# Patient Record
(demographics unavailable — no encounter records)

---

## 2017-03-27 NOTE — RAD
CHEST TWO VIEWS:

 

History: Pneumonia. 

 

Comparison: 4-20-15

 

FINDINGS: 

The cardiac silhouette is unremarkable. Lungs are hyperinflated with scattered areas of linear scarr
ing.  Mediastinum is midline with aortic calcification and sternotomy wires. There is no confluent a
ir space consolidation, pneumothorax, or pleural fluid evident. 

 

IMPRESSION: 

1. COPD. 

2. Atherosclerosis.

 

POS: BRITTANY

## 2017-11-07 NOTE — XMS
Continuity of Care Document

 Created on:2017



Patient:KIRSTEN TAVERAS

Sex:Male

:1930

External Reference #:[id number]





Demographics







 Address  [place] 



   [place] [address]

 

 Home Phone  [phone]

 

 Preferred Language  Unknown

 

 Marital Status  Unknown

 

 Zoroastrianism Affiliation  Unknown

 

 Race  Unknown

 

 Additional Race(s)  Unavailable

 

 Ethnic Group  Unknown









Author







 Organization  [facility]









Support







 Name  Relationship  Address  Phone

 

 DENITA GONZALEZ  Caregiver  [id number]  [phone]



     [address]  



     [place] [address]  

 

 Johnnycherie Thiago  Caregiver  [address] [facility]  [phone]



     [address]  



     [place] [address]  

 

 BERKLEY TAVERAS  Next of Kin  [address]   [phone]



     [place] [address]  









Care Team Providers







 Name  Role  Phone

 

 Thiago Montes  Primary Care Physician  [phone]









Insurance Providers







 Payer Name  Policy Number  Subscriber Name  Relationship

 

 [employer] MEDICARE ADVANTAGE PPO  [id number]  KIRSTEN TAVERAS  SELF/SAME AS PATIENT







Advance Directives







 Directive  Response  Recorded Date/Time

 

 Advance Directive?  N  17 3:39pm

 

 Living Will?  Y  17 3:39pm

 

 Health Care Proxy?  N  17 3:39pm

 

 Healthcare Power of ?  N  17 3:39pm

 

 Is the patient an Organ Donor?  N  17 3:39pm







Chief Complaint and Reason for Visit







 Reason for Visit  SORE THROAT







Problems

Active Medical Problems







 Problem  Onset Date  Recorded Date  Status

 

 Chest pain  Unknown  16  Active

 

 CAD (coronary artery disease)  Unknown  16  Active

 

 Seizure disorder  Unknown  16  Active

 

 Hyponatremia  Unknown  16  Active

 

 Anxiety attack  Unknown  16  Active

 

 Pneumonia  Unknown  17  Active

 

 Bronchitis  Unknown  17  Active

 

 Thrush of mouth and esophagus  Unknown  17  Active

 

 Oral ulceration  Unknown  17  Active

 

 Painful mouth  Unknown  17  Active





Active Surgical Problems







 Problem  Onset Date  Recorded Date  Status

 

 S/P CABG (coronary artery bypass graft)  Unknown  16  Active







Medications

Current Home Medications







 Medication  Dose  Units  Route  Directions  Days/Qty  Instructions  Start Date

 

 ASPIRIN  81  MG  By Mouth  EVERY DAY @  30    



 (ASPIR-LOW) 81        0900      



 MG TAB              

 

 Fluconazole  100  MG  By Mouth  EVERY DAY @  5    



 (DIFLUCAN 100 MG        0900      



 TAB) 100 MG TAB              

 

 Isosorbide  15  MG  By Mouth  EVERY DAY @      



 Mononitrate        0900      



 (Imdur) 30 MG              



 TAB              

 

 LIDOCAINE HCL  5  ML  Mouth /  EVERY SIX HOURS  1    17



 (MOUTH-THROAT)      Throat  AS NEEDED as      



 (LIDOCAINE        needed for      



 VISCOUS 2% ORAL        MOUTH PAIN      



 SOLN) 2 % SOL              

 

 METOPROLOL  25  MG  By Mouth  TWICE A DAY  60    



 TARTRATE        (0900; )      



 (LOPRESSOR 25 MG              



 TAB) 25 MG TAB              

 

 Nystatin  5  ML  Mouth /  FOUR TIMES A  200    



 (Mouth-Throat)      Throat  DAY (900)      



 (NYSTATIN              



 SUSPENSION              



 (ORAL)) 100,000              



 UNITS/ML VAN              

 

 Phenytoin Sodium  400  MG  By Mouth  AT BEDTIME      



 (DILANTIN 100 MG        ()      



 CAP) 100 MG CAP              

 

 Simvastatin  80  MG  By Mouth  AT BEDTIME  30    



 (ZOCOR 80 MG) 80        ()      



 MG TAB              





Past Home Medications







 Medication  Directions  Ordered  Status

 

 Acetaminophen W/Codeine #3  EVERY EIGHT HOURS AS NEEDED as  09/13/15  
Discontinued



 (Tylenol/Codeine #3 Tab) 300  needed for PAIN    



 Mg/30 Mg Tab Tab, 1 Tab By Mouth      

 

 Azithromycin (Azithromycin 500 Mg  EVERY DAY @ 0900  17  Discontinued



 Tab) 500 Mg Tab Tab, 500 Mg By      



 Mouth      

 

 Prednisone (Prednisone 20 Mg Tab)  EVERY DAY @ 17  Discontinued



 20 Mg Tab Tab, 20 Mg By Mouth      

 

 Promethazine & Phenyl 6.25/5MG  EVERY SIX HOURS AS NEEDED as  17  
Discontinued



 (Promethazine-Phenylephrine  6.25  needed for COUGH/CONGESTION    



 Mg/5 Mg/5 Ml) 5 Ml Syp Syp, 5 Ml      



 ByMouth      







Social History







 Problem  Response  Recorded Date

 

 Recreational drugs?  N  17

 

 Alcohol?  N  17











 Query  Response  Start Date  Stop Date

 

 Smoking Status:  Never Smoker    







Hospital Discharge Instructions

No hospital discharge instructions.



Plan of Care







 Discharge Date  17

 

 Disposition  HOME/SELF CARE

 

 Condition at Discharge  STABLE

 

 Instructions/Education Provided  DI for Mouth Pain

 

 Forms Provided  Discharge Form

 

 Prescriptions  See Medications Section

 

 Referrals  Thiago Montes -

 

 Additional Instructions/Education  F/U PCP ON MONDAY



   AVOID EXTREME TEMP OF FOOD



   CONT NYSTATIN SWISH AND SWALLOW AS RX BY PCP







Functional Status

No functional status results.



Allergies, Adverse Reactions, Alerts

No known allergies.



Immunizations

No Known History of Immunizations.



Vital Signs







 Vital Reading  Collection Date/Time  Result

 

 Blood Pressure  17 4:03pm  118/65

 

 Temperature  17 4:03pm  98.2 F

 

 Temperature Source  17 3:35pm  Oral

 

 Respiratory Rate  17 2:30pm  18

 

 Pulse Rate  17 4:03pm  76

 

 Bedside Pulse Oximetry  17 4:03pm  98

 

 Height  17 3:35pm  5 ft 10 in

 

 Height  17 3:35pm  177.8 cm

 

 Weight  17 3:35pm  200 lb

 

 Weight  17 3:35pm  90.718 kg

 

 Body Mass Index  17 3:35pm  28.7 kg/m2







Results

Laboratory Results







 Test Name  Result  Units  Flags  Reference  Collection  Result  Comments



           Date/Time  Date/Time  

 

 White Blood Count  9.1  X 10^3    4.8-10.8  17  



           1:50pm  2:41pm  

 

 Red Blood Count  4.21  X 10^6  L  4.70-6.00  17  



           1:50pm  2:41pm  

 

 Hemoglobin  13.9  g/dL    13.5-17.5  17  



           1:50pm  2:41pm  

 

 Hematocrit  41.1  %  L  42.0-52.0  17  



           1:50pm  2:41pm  

 

 Mean Corpuscular  97.6  fl    80.0-100.0  17  



 Volume          1:50pm  2:41pm  

 

 Mean Corpuscular  33.0  pg  H  27.0-31.0  17  



 Hemoglobin          1:50pm  2:41pm  

 

 Mean Corpuscular  33.8  g/dl    32.0-36.0  17  



 Hgb Concent Diff          1:50pm  2:41pm  

 

 Red Cell  13.4  %    11.5-14.5  17  



 Distribution Width          1:50pm  2:41pm  

 

 Platelet Count  224  X 10^3    130-400  17  



           1:50pm  2:41pm  

 

 Mean Platelet  7.3  fl  L  7.4-10.4  17  



 Volume          1:50pm  2:41pm  

 

 Granulocytes (%)  68.7  %    50.0-75.0  17  



           1:50pm  2:41pm  

 

 Lymphocytes %  23.8  %    20.0-40.0  17  



           1:50pm  2:41pm  

 

 Mid Range Cells %  7.5  %    0.1-24.0  17  



 (auto)          1:50pm  2:41pm  

 

 Granulocytes #  6.3  X 10^3    1.8-6.4  17  



           1:50pm  2:41pm  

 

 Lymphocytes #  2.2  X 10^3    1.2-3.6  17  



           1:50pm  2:41pm  

 

 Mid Range Cells #  0.7  X 10^3    0.0-1.8  17  



 (auto)          1:50pm  2:41pm  

 

 Manual  NO        17  



 Differential          1:50pm  2:41pm  

 

 Sodium Level  137  mmol/L    135-144  17  



           1:50pm  2:42pm  

 

 Potassium Level  5.0  mmol/L    3.5-5.1  17  



           1:50pm  2:42pm  

 

 Chloride Level  99  mmol/L  L  101-111  17  



           1:50pm  2:42pm  

 

 Carbon Dioxide  28  mmol/L    22-32  17  



 Level          1:50pm  2:42pm  

 

 Anion Gap  15.0  mmol/L    10-20  17  



           1:50pm  2:42pm  

 

 Random Glucose  122  mg/dL  H    17  Random glucose > 
200 mg/dL in a patient with typical



           1:50pm  2:42pm  symptoms of diabetes (polydipsia, polyuria and 
unexplained



               weight loss) satisfies ADA criteria for diabetes mellitus if



               confirmed by repeat testing on another day.



               



               Confirmation is unnecessary when acute metabolic



               decompensation with hyperglycemia is manifested.



               



               Reference:  Report of the Expert Committee on the Diagnosis



               and Classification of Diabetes Mellitus. Diabetes Care,



               20:1183, 1997.

 

 Creatinine  1.1  mg/dL    0.61-1.24  17  



           1:50pm  2:42pm  

 

 EGFR Note  > 60.0        17  eGFR (Estimated Glomerular 
Filtration Rate)



           1:50pm  2:42pm  Reference Range:  >60 ml/min/1.73m



               



               eGFR calculation value obtained using the MDRD equation. The



               reportable reference ranges is recommended to be greater



               than 60 ml/min/1.73m. This is an estimation of the patient's



               GFR and clinical correlation is recommended.

 

 Blood Urea  13  mg/dL    8-26  17  



 Nitrogen          1:50pm  2:42pm  

 

 Calcium Level  8.9  mg/dL    8.9-10.3  17  



           1:50pm  2:42pm  

 

 Albumin  3.7  g/dL    3.5-5.0  17  



           1:50pm  2:42pm  

 

 Total Bilirubin  0.5  mg/dL    0.3-1.2  17  



           1:50pm  2:42pm  

 

 Alkaline  82  IU/L    32-91  17  



 Phosphatase          1:50pm  2:42pm  

 

 Total Protein  7.4  g/dL    6.5-8.1  17  



           1:50pm  2:42pm  

 

 Alanine  17  IU/L    7-55  17  



 Aminotransferase          1:50pm  2:42pm  



 (ALT/SGPT)              

 

 Aspartate Amino  21  IU/L    15-41  17  



 Transf (AST/SGOT)          1:50pm  2:42pm  

 

 Globulin  3.7  g/dL  H  2.3-3.5  17  



           1:50pm  2:42pm  

 

 Albumin/Globulin  1.0    L  1.2-2.2  17  



 Ratio          1:50pm  2:42pm  

 

 Group A  NEGATIVE      NEGATIVE  17  Negative screens will be 
confirmed by culture. Please see



 Streptococcus          1:34pm  1:53pm  separate microbiology report for these 
results.



 Screen              

 

 Influenza Type A  NEGATIVE      NEGATIVE  17  A negative test 
result should be interpreted as a



 Antigen          1:34pm  1:53pm  presumptive negative for the presense of 
influenza A



               antigen.



               



               Negative results can occur from inadequate sample collection



               or levels of antigen which fall below the limits of



               detection of the test.

 

 Influenza Type B  NEGATIVE      NEGATIVE  17  A negative test 
result should be interpreted as a



 Antigen          1:34pm  1:53pm  presumptive negative for the presense of 
influenza B



               antigen.



               



               Negative results can occur from inadequate sample collection



               or levels of antigen which fall below the limits of



               detection of the test.







Procedures

No Known History of Procedures.



Encounters







 Encounter  Location  Arrival/Admit Date  Discharge/Depart Date  Attending



         Provider

 

 Departed  [place]  17 3:25pm  17 4:03pm  [facility]



 [facility]      

 

 Departed  [place]  17 1:27pm  17 3:25pm  [facility]



 Emergency  [facility]      











 Encounter Diagnosis  Onset Date

 

 Thrush of mouth and esophagus  

 

 Oral ulceration  

 

 Painful mouth

## 2017-11-07 NOTE — CT
CTA CHEST WITH  3D VOLUME RENDERIN17

 

CLINICAL HISTORY: 

Mid sternal chest pain. 

 

FINDINGS:  

No large, central filling defect of the pulmonary arterial system due to indicate pulmonary embolus.
 Multifocal linear density as well as scattered areas of ground glass and interstitial opacification
 of each lung are present favoring atelectasis and/or scar. Bleb formation at the right lung base is
 seen. No evidence of pleural effusions or pneumothorax. There is scattered vascular disease. 

 

IMPRESSION:  

No evidence of pulmonary embolus within the pulmonary trunk, main pulmonary arteries or proximal seg
mental branches.

 

There is scattered pulmonary and parenchymal opacities as discussed above. 

 

POS: ARIS

## 2017-11-07 NOTE — XMS
Continuity of Care Document

 Created on:2016



Patient:KIRSTEN DEL ROSARIO

Sex:Male

:1930

External Reference #:[id number]





Demographics







 Address  [place] 



   [place] [address]

 

 Phone  [phone]

 

 Preferred Language  Unknown

 

 Marital Status  Unknown

 

 Yazidism Affiliation  Unknown

 

 Race  Unknown

 

 Additional Race(s)  Unavailable

 

 Ethnic Group  Unknown









Author







 Organization  [facility]









Support







 Name  Relationship  Address  Phone

 

 Subhash Chan  Caregiver  [address]   Unavailable



     [place] [address]  

 

 hTiago Montes  Caregiver  [address]  Unavailable



     [address]  



     [place] [address]  

 

 NYDIA TORRES  Caregiver  [address]  Unavailable



     HOSPITALIST  



     [place] [address]  

 

 DARCYBERKLEY  Next of Kin  [address]   Unavailable



     [place] [address]  









Care Team Providers







 Name  Role  Phone

 

 Thiago Montes  Primary Care Physician  Unavailable









Insurance Providers







 Payer Name  Policy Number  Subscriber Name  Relationship

 

 [employer] MEDICARE ADVANTAGE PPO  [id number]  KIRSTEN DEL ROSARIO  SELF/SAME AS PATIENT







Advance Directives







 Directive  Response  Recorded Date/Time

 

 Advance Directive?  N  16 8:16pm

 

 Living Will?  Y  16 8:16pm

 

 Health Care Proxy?  N  16 8:16pm

 

 Healthcare Power of ?  N  16 8:16pm

 

 Is the patient an Organ Donor?  N  16 2:33pm







Chief Complaint and Reason for Visit







 Reason for Visit  CHEST PAIN







Problems

Active Medical Problems







 Problem  Onset Date  Recorded Date  Status

 

 Chest pain  Unknown  16  Active

 

 CAD (coronary artery disease)  Unknown  16  Active

 

 Seizure disorder  Unknown  16  Active

 

 Hyponatremia  Unknown  16  Active

 

 Anxiety attack  Unknown  16  Active





Active Surgical Problems







 Problem  Onset Date  Recorded Date  Status

 

 S/P CABG (coronary artery bypass graft)  Unknown  16  Active







Medications

Current Home Medications







 Medication  Dose  Units  Route  Directions  Days/Qty  Instructions  Start Date

 

 ASPIRIN  81  MG  PO  EVERY DAY @ 0900  30    



 (ASPIR-LOW) 81 MG              



 TAB              

 

 Isosorbide  15  MG  PO  EVERY DAY @ 0900      



 Mononitrate              



 (Imdur) 30 MG TAB              

 

 METOPROLOL  25  MG  PO  TWICE A DAY  60    



 TARTRATE        (0900; 2100)      



 (LOPRESSOR 25 MG              



 TAB) 25 MG TAB              

 

 Phenytoin Sodium  400  MG  PO  AT BEDTIME (2100)      



 (DILANTIN 100 MG              



 CAP) 100 MG CAP              

 

 Simvastatin (ZOCOR  80  MG  PO  AT BEDTIME (2100)  30    



 80 MG) 80 MG TAB              





Past Home Medications







 Medication  Directions  Ordered  Status

 

 Acetaminophen W/Codeine #3  EVERY EIGHT HOURS AS NEEDED  09/13/15  Discontinued



 (Tylenol #3) 300 Mg/30 Mg Tab  PRN PAIN    



 Tab, 1 Tab Po      







Social History







 Problem  Response  Recorded Date

 

 Recreational drugs?  N  16

 

 Alcohol?  N  16











 Query  Response  Start Date  Stop Date

 

 Smoking Status:  Former Smoker    







Hospital Discharge Instructions

Diagnosis: CHEST PAIN

Goal: IMPROVEMENT

Intervention:

MEDICATIONS, MONITOR TELEMETRY, MONITOR LABS, MONITOR VITAL SIGNS

Anticipated Discharge Date 16

Anticipated Discharge Time 0957







Plan of Care







 Discharge Date  16

 

 Disposition  HOME/SELF CARE

 

 Instructions/Education Provided  DI for Chest Pain

 

 Forms Provided  Patient Portal Logon Page

 

 Prescriptions  See Medications Section

 

 Additional Instructions/Education  FOLLOW UP [facility] PRIMARY CARE PHYSICIAN 
WITHIN 1 WEEK AND CARDIOLOGIST



   WITHIN 2 WEEKS



   



   



   DIET-CARDIAC



   ACTIVITY-REGULAR

 

 Care Plan and Goals  See Discharge Instructions section







Functional Status







 Query  Response  Date Recorded

 

 Speech Pattern:  Normal  2016 7:55pm

 

 Seizure Act.?  N  2016 7:55pm

 

 Sensory/Motor Response:  Normal  2016 7:55pm

 

 RUE Strength:  Normal  2016 7:55pm

 

 LUE Strength:  Normal  2016 7:55pm

 

 RLE Strength:  Normal  2016 7:55pm

 

 LLE Strength:  Normal  2016 7:55pm

 

 Gait:  Slow  2016 7:55pm

 

 WNL?+  Y  2016 9:07am

 

 Person:  N  2016 7:55pm

 

 Place:  N  2016 7:55pm

 

 Time:  N  2016 7:55pm

 

 Situation:  N  2016 7:55pm

 

 LOC:  Alert  2016 7:55pm







Allergies, Adverse Reactions, Alerts

No known allergies.



Immunizations







 Name  Date Given  Type

 

 Flu vaccine this seaso  N  Historical

 

 Pnuemonia Vaccine last 5 years?  Y  Historical







Vital Signs







 Vital Reading  Collection Date/Time  Result

 

 Blood Pressure  16 9:56am  136/66

 

 Blood Pressure Source  16 4:10am  Auto Cuff

 

 Patient Temperature  16 9:56am  97.9

 

 Temperature Source  16 11:44pm  Oral

 

 Respiratory Rate  16 9:56am  18

 

 Pulse Rate  16 9:56am  53

 

 Pulse Location  16 4:10am  Monitor

 

 Bedside Pulse Oximetry  16 9:56am  97

 

 Height  16 8:16pm  177.8 cm

 

 Height  16 8:16pm  5 ft 10 in

 

 Weight  16 6:00am  90.718 kg

 

 Weight  16 6:00am  200 lb 0.00 oz

 

 Body Mass Index  16 8:16pm  28.7







Results

Laboratory Results







 Test Name  Result  Units  Flags  Reference  Collection  Result  Comments



           Date/Time  Date/Time  

 

 Glucose  104  mg/dL      16  



 (Fingerstick)          8:04am  8:30am  

 

 N/A  CALCULATE        16  CORONARY HEART DISEASE (CHD) RISK 
FACTORS:



   BELOW        4:25am  5:12am  _________________________________________



               +1, Age (y):   Men, >45



               Women, >55 or Premature Menopause



               Without Estrogen Therapy



               +1, Family History of Premature CHD



               +1, Current Cigarette Smoking



               +1, Hypertension



               +1, Low HDL-C: <40 mg/dL



               -1, High HDL-C: 60 mg/dL or More



               _____  Total RFs



               



               CHD Risk Equivalents (REq): Diabetes



               Other Forms of Atherosclerotic Disease



               



               Lipid testing of hospitalized patients may be inaccurate due



               to fluctuations from the patients normal metabolic state.



               



               Accurate triglyceride and LDL testing requires a fasting



               specimen.  If non-fasting cholesterol > am=571 mg/dL or



               HDL is < 40 mg/dL, fasting lipid panel is recommended.



               Repeat testing recommended prior to treatment.



               ____________________________________________________________



               Desirable Levels

 

 Cholesterol  149  mg/dL    0-200  16  



 Level          4:25am  5:39am  

 

 Triglycerides  152  mg/dL  H    16  Normal triglycerides
:   <150 mg/dL



 Level          4:25am  5:39am  Borderline-high triglycerides:  150-199 mg/dL



               High triglycerides:  200-499 mg/dL



               Very high triglycerides: > ka=803 mg/dL

 

 HDL  38  mg/dL  L    16  



 Cholesterol          4:25am  5:39am  

 

 N/A  3.9      0.0-5.0  16  



           4:25am  5:39am  

 

 LDL  80  mg/dL    0-130  16  *LDL Cholesterol  <130 mg/dL, No 
CHD or CHD Risk Equivalent



 Cholesterol,          4:25am  5:39am  <100 mg/dL, With CHD or CHD Risk 
Equivalent



 Calculated              



               LDL Cholesterol Therapeutic Goal:



               100 mg/dL or Less if CHD or CHD Risk Equivalent Present



               <130 mg/dL if No CHD or REq; 2 or more Risk Factors



               <160 mg/dL if No CHD or REq; 0-1 Risk Factors



               



               Reference: ATP III, GINA, 285:38, 4505-97, 2001.

 

 Creatine  74  IU/L      16  



 Kinase          4:25am  5:39am  

 

 Hemoglobin  6.2  %  H  4.0-6.0  16  Elevated levels of HbA1c 
suggest the need for more



 A1c Percent          4:25am  5:35am  aggresssive treatment of glycemia. The 
American Diabetes



               Association recommends that a primary goal of therapy



               should be a HbA1c of <7% and that physicians should



               reevaluate the treatment regimen in patients with HbA1c



               values consistently >8%.

 

 N/A  132  mg/dL      16  A1C Result%    Estimated Avg.Glucose (
EAG)



           4:25am  5:35am  __________________________________________



               6.0%     126 mg/dL



               6.5%     140 mg/dL



               7.0%     154 mg/dL



               7.5%     169 mg/dL



               8.0%     183 mg/dL



               8.5%     197 mg/dL



               9.0%     212 mg/dL



               9.5%     226 mg/dL



               10.0%     240 mg/dL



               



               Reference: Tl, et al, Diabetes Care 31: 1437, 2008.

 

 Creatine  3.2  ng/ML    0.6-6.3  16  



 Kinase MB          4:25am  5:39am  

 

 Troponin I  < 0.01  ng/mL    0.00-0.03  16  



           4:25am  5:39am  Troponin I-Interpretation Reference :



               



               <0.03  ng/mL       NEGATIVE



               



               0.04 - 0.49 ng/mL  EQUIVOCAL



               



               =OR > 0.5 ng/mL   CONSISTENT WITH ACUTE MYOCARDIAL INJURY



               



               98% of confirmed AMI patients will have at least one value



               in a set or serial specimens >0.50 ng/ml.



               



               99% of normals are between 0.0 - 0.10 ng/ml.



               



               Serial samples on a patient that are all <0.10 ng/ml



               effectively rules out AMI.



               



               Persistently increased troponin I values that are above the



               upper limit of normal but below the threshold for AMI



               indicate mycardial injury but not necessarily an ischemic



               mechanism of injury.



               



               *** Troponin Important Points ***



               



               1. Troponin is specific for myocardial injury but not for



               AMI.  Elevated troponin levels above the upper limit of



               normal but below the AMI cutoff may be present in cardiac



               injury other then AMI and represent some degree of risk.



               



               2.  Elevated troponin levels inconsistent with patient



               history or clinical condition should be considered a sign to



               investigate for other cardiac conditions.



               



               3.  Serial sampling is critical for accurate diagnosis.

 

 Phenytoin  12  ug/mL    10-16  



 (Dilantin)          4:25am  5:39am  



 Level              

 

 White Blood  7.1  K/uL    4.8-10.8  16  



 Count          3:00pm  3:25pm  

 

 Red Blood  4.16  M/uL  L  4.70-6.00  16  



 Count          3:00pm  3:25pm  

 

 Hemoglobin  13.0  g/dL  L  13.5-17.5  16  



           3:00pm  3:25pm  

 

 Hematocrit  39.8  %  L  42.0-52.0  16  



           3:00pm  3:25pm  

 

 Mean  95.7  fl    80.0-100.0  16  



 Corpuscular          3:00pm  3:25pm  



 Volume              

 

 Mean  31.3  pg  H  27.0-31.0  16  



 Corpuscular          3:00pm  3:25pm  



 Hemoglobin              

 

 Mean  32.7  g/dL    32.0-36.0  16  



 Corpuscular          3:00pm  3:25pm  



 Hgb Concent              



 Diff              

 

 Red Cell  14.7  %  H  11.5-14.5  16  



 Distribution          3:00pm  3:25pm  



 Width              

 

 Platelet  229  K/uL    130-400  16  



 Count          3:00pm  3:25pm  

 

 Mean Platelet  8.0  fl    7.4-10.4  16  



 Volume          3:00pm  3:25pm  

 

 Granulocytes  48.7  %  L  50.0-75.0  16  



 (%)          3:00pm  3:25pm  

 

 Lymphocytes %  37.6  %    20.0-40.0  16  



           3:00pm  3:25pm  

 

 Monocytes %  9.3  %    0.0-15.0  16  



           3:00pm  3:25pm  

 

 Eosinophils %  3.9  %    0.0-10.0  16  



           3:00pm  3:25pm  

 

 Basophils %  0.5  %    0.0-2.0  16  



           3:00pm  3:25pm  

 

 Granulocytes  3.4  K/uL    1.8-6.4  16  



 #          3:00pm  3:25pm  

 

 Lymphocytes #  2.7  K/uL    1.2-3.6  16  



           3:00pm  3:25pm  

 

 Monocytes #  0.7  K/uL    0.3-0.9  16  



           3:00pm  3:25pm  

 

 Eosinophils #  0.3  K/UL    0.0-0.5  16  



           3:00pm  3:25pm  

 

 BASO #  0.0  K/UL    0.0-0.2  16  



           3:00pm  3:25pm  

 

 Manual  NO        16  



 Differential          3:00pm  3:25pm  

 

 Prothrombin  11.7  SECONDS    10.0-12.9  16  



 Time          3:00pm  3:29pm  

 

 INR  1.1        16  



 International          3:00pm  3:29pm  THE INR IS TO BE USED ONLY FOR 
MONITORING ORAL ANTICOAGULANT



 Normalized              THERAPY.



 Ratio              



               INDICATION                                 INR VALUE



               



               1. Prophylaxis of venous thrombosis                2.0-3.0



               (high-risk surgery)



               Treatment of venous thrombosis



               Treatment of PE



               Prevention of systemic embolism



               Tissue heart valves



               AMI (to prevent systemic embolism)



               Valvular heart disease



               Atrial fibrillation



               Bileaflet mechanical valve in aortic position



               



               2. Mechanical prosthetic heart valves (high risk)  2.5-3.5



               Thrombosis and Antiphospholipid syndrome



               Prevention of recurrent MI



               



               Sixth ACCP Consensus Conference on Antithrombotic Therapy,



               Chest 2001; 119:Supplement 8-21.

 

 Activated  30.1  SECONDS    25.1-36.5  16  



 Partial          3:00pm  3:29pm  



 Thromboplast              



 Time              

 

 Sodium Level  133  mmol/L  L  135-144  16  



           3:00pm  3:45pm  

 

 Potassium  4.6  mmol/L    3.5-5.1  16  



 Level          3:00pm  3:45pm  

 

 Chloride  99  mmol/L  L  101-111  16  



 Level          3:00pm  3:45pm  

 

 Carbon  24  mmol/L    22-32  16  



 Dioxide Level          3:00pm  3:45pm  

 

 Anion Gap  14.6  mmol/L    10-20  16  



           3:00pm  3:45pm  

 

 Glucose Level  211  mg/dL  H    16  Random glucose > 200 
mg/dL in a patient with typical



           3:00pm  3:45pm  symptoms of diabetes (polydipsia, polyuria and 
unexplained



               weight loss) satisfies ADA criteria for diabetes mellitus if



               confirmed by repeat testing on another day.



               



               Confirmation is unnecessary when acute metabolic



               decompensation with hyperglycemia is manifested.



               



               Reference:  Report of the Expert Committee on the Diagnosis



               and Classification of Diabetes Mellitus. Diabetes Care,



               20:1183, 1997.

 

 Blood Urea  15  mg/dL    8-16  



 Nitrogen          3:00pm  3:45pm  

 

 Creatinine  1.10  mg/dL    0.61-1.24  16  



           3:00pm  3:45pm  

 

 EGFR Note  > 60.0        16  eGFR (Estimated Glomerular 
Filtration Rate)



           3:00pm  3:45pm  Reference Range: >60 ml/min/1.73m



               



               eGFR calculation value obtained using the [facility]



               Quadratic (MCQ) equation. The reportable reference range is



               recommended to be greater than 60 ml/min/1.73m. This is an



               estimation of the patient's GFR and clinical correlation is



               recommended.



               



               This eGFR calculation does not account for race. This result



               may differ from other equations available.

 

 Calcium Level  8.3  mg/dL  L  8.9-10.3  16  



           3:00pm  3:45pm  

 

 Albumin  4.1  g/dL    3.5-5.0  16  



           3:00pm  3:45pm  

 

 Total  < 0.2  mg/dL  L  0.3-1.2  16  



 Bilirubin          3:00pm  3:45pm  

 

 Alkaline  92  IU/L  H  32-91  16  



 Phosphatase          3:00pm  3:45pm  

 

 Total Protein  7.5  g/dL    6.5-8.1  16  



           3:00pm  3:45pm  

 

 Alanine  14  IU/L    7-55  16  



 Aminotransfer          3:00pm  3:45pm  



 ase              



 (ALT/SGPT)              

 

 Aspartate  25  IU/L    15-41  16  



 Amino Transf          3:00pm  3:45pm  



 (AST/SGOT)              

 

 Globulin  3.4  g/dL    2.3-3.5  16  



           3:00pm  3:45pm  

 

 Albumin/Globu  1.2      1.2-2.2  16  



 vimal Ratio          3:00pm  3:45pm  

 

 Myoglobin  28  ug/mL    17.4-106.0  16  



           3:00pm  3:45pm  





[facility]

DISCHARGE SUMMARY



Kirsten Del Rosario

ADM:  2016

DIS:  2016

SALIMA#:  [id number]

[facility]#:  [id number]



DISCHARGE DIAGNOSES:

1.    Atypical chest pain.

2.    Anxiety attack.

3.    Seizure disorder.

4.    Coronary artery disease.

5.    Hyponatremia.



HOSPITAL COURSE:  This is an 86-year-old male with past medical

history of coronary artery disease, status post CABG, and history of

seizure disorder who presented with complaints of chest pain and

dizziness.  Patient lives with his wife.  He was getting a biopsy and

does admit that he was feeling nervous after which these symptoms

started.  Patient was ruled out for any acute coronary syndrome.  He

has 3 negative troponins and EKG shows no acute ST changes.  He has an

established cardiologist as an outpatient.  It has been recommended to

follow up with them.  Currently, he is stable for discharge.



Vitals:  Temperature 97.9, blood pressure 136/66, pulse 53, saturating

97% on room air.  Physical exam:  Patient is resting comfortably in

bed, no acute distress.  HEENT:  Head normocephalic. Extraocular

movements are intact.  Neck is supple.  Lungs are clear with good air

entry bilaterally.  No crackles or wheezes.  Cardiovascular:  S1, S2.

Regular rate and rhythm.  Abdomen is soft, nontender. Extremities with

no edema or cyanosis.



ACTIVITY:  Regular.



DIET:  Cardiac.



MEDICATIONS:  Please see reconciliation.



FOLLOWUP:  PCP within 1 week and cardiologist within 2 weeks.







SP/KAYE/        Nydia Torres M.D.

DISCHARGE SUMMARY   DATE:                 TIME:



D:  2016 09:53:49  T:  2016 10:01:26/DTS 

#[id number]

Report Dictated By:   NYDIA TORRES

Report Signed By:     NYDIA TORRES

16   1227

<<Signature on File>>

Report Cosigned By:



Procedures

No Known History of Procedures.



Encounters







 Encounter  Location  Arrival/Admit Date  Discharge/Depart Date  Attending



         Provider

 

 Discharged  [place]  16 1:28pm  16 11:01am  NYDIA TORRES



 [facility]      

 

 Discharged  [place]  16 1:28pm  16 11[facility]01am  [facility]      











 Encounter Diagnosis  Onset Date

 

 Chest pain  

 

 CAD (coronary artery disease)  

 

 Seizure disorder  

 

 Hyponatremia  

 

 Anxiety attack

## 2017-11-07 NOTE — XMS
Continuity of Care Document

 Created on:2017



Patient:KIRSTEN TAVERAS

Sex:Male

:1930

External Reference #:[id number]





Demographics







 Address  [address] 



   [place] [address]

 

 Home Phone  [phone]

 

 Preferred Language  Unknown

 

 Marital Status  Unknown

 

 Yazidism Affiliation  Unknown

 

 Race  Unknown

 

 Additional Race(s)  Unavailable

 

 Ethnic Group  Unknown









Author







 Organization  [facility]









Support







 Name  Relationship  Address  Phone

 

 ATILIO CUNNINGHAM  Caregiver  [address]  [phone]



     [address]  



     [place] [address]  

 

 Thiago Montes  Caregiver  [address]  [phone]



     [address]  



     [place] [address]  









Care Team Providers







 Name  Role  Phone

 

 Thiago Montes  Primary Care Physician  [phone]









Insurance Providers







 Payer Name  Policy Number  Subscriber Name  Relationship

 

 [employer] MED[employer]ARE [employer]VANT[employer] PPO  [id number]  KIRSTEN TAVERAS  SELF/SAME AS PATIENT







Advance Directives







 Directive  Response  Recorded Date/Time

 

 Advance Directive?  N  17 2:38pm

 

 Living Will?  Y  17 2:38pm

 

 Health Care Proxy?  N  17 2:38pm

 

 Healthcare Power of ?  N  17 2:38pm

 

 Is the patient an Organ Donor?  N  17 2:38pm







Chief Complaint and Reason for Visit







 Reason for Visit  R ARM INSECT BITES







Problems

Active Medical Problems







 Problem  Onset Date  Recorded Date  Status

 

 Chest pain  Unknown  16  Active

 

 CAD (coronary artery disease)  Unknown  16  Active

 

 Seizure disorder  Unknown  16  Active

 

 Hyponatremia  Unknown  16  Active

 

 Anxiety attack  Unknown  16  Active

 

 Pneumonia  Unknown  17  Active

 

 Bronchitis  Unknown  17  Active

 

 Thrush of mouth and esophagus  Unknown  17  Active

 

 Oral ulceration  Unknown  17  Active

 

 Painful mouth  Unknown  17  Active

 

 Allergy to insect stings  Unknown  17  Active





Active Surgical Problems







 Problem  Onset Date  Recorded Date  Status

 

 S/P CABG (coronary artery bypass graft)  Unknown  16  Active







Medications

Current Home Medications







 Medication  Dose  Units  Route  Directions  Days/Qty  Instructions  Start Date

 

 ASPIRIN  81  MG  By Mouth  EVERY DAY @  30    



 (ASPIR-LOW) 81 MG        0900      



 TAB              

 

 Fluconazole  100  MG  By Mouth  EVERY DAY @  5    



 (DIFLUCAN 100 MG        0900      



 TAB) 100 MG TAB              

 

 Isosorbide  15  MG  By Mouth  EVERY DAY @      



 Mononitrate        09      



 (Imdur) 30 MG TAB              

 

 LEVETIRACETAM  500  MG  By Mouth  EVERY DAY @      



 (LEVETIRACETAM ER        900      



 500 MG TAB) 500 MG              



 TAB              

 

 METOPROLOL  50  MG  By Mouth  TWICE A DAY  60    



 TARTRATE        (900; )      



 (LOPRESSOR 25 MG              



 TAB) 25 MG TAB              

 

 MIRTAZAPINE  15  MG  By Mouth  AT BEDTIME      



 (MIRTAZAPINE 15 MG        ()      



 TAB) 15 MG TAB              

 

 Phenytoin Sodium  400  MG  By Mouth  AT BEDTIME      



 (DILANTIN 100 MG        ()      



 CAP) 100 MG CAP              

 

 Simvastatin (ZOCOR  80  MG  By Mouth  AT BEDTIME  30    



 80 MG) 80 MG TAB        ()      





Past Home Medications







 Medication  Directions  Ordered  Status

 

 Acetaminophen W/Codeine #3  EVERY EIGHT HOURS AS NEEDED as  09/13/15  
Discontinued



 (Tylenol/Codeine #3 Tab) 300  needed for PAIN    



 Mg/30 Mg Tab Tab, 1 Tab By Mouth      

 

 Azithromycin (Azithromycin 500 Mg  EVERY DAY @ 0917  Discontinued



 Tab) 500 Mg Tab Tab, 500 Mg By      



 Mouth      

 

 Desloratadine (Desloratadine 5 Mg  EVERY DAY @ 0917  Discontinued



 Odt Tab) 5 Mg Tab Tab, 5 Mg By      



 Mouth      

 

 Lidocaine Hcl (Mouth-Throat)  EVERY SIX HOURS AS NEEDED as  17  
Discontinued



 (Lidocaine Viscous 2% Oral Soln)  needed for MOUTH PAIN    



 2 % Sol Sol, 5 Ml Mouth / Throat      

 

 Nystatin (Mouth-Throat) (Nystatin  FOUR TIMES A DAY (900)  Unknown  
Discontinued



 Suspension (Oral)) 100,000      



 Units/Ml Sonia Sonia, 5 Ml Mouth /      



 Throat      

 

 Prednisone (Prednisone 20 Mg Tab)  EVERY DAY @ 0900  17  Discontinued



 20 Mg Tab Tab, 20 Mg By Mouth      

 

 Promethazine & Phenyl 6.25/5MG  EVERY SIX HOURS AS NEEDED as  17  
Discontinued



 (Promethazine-Phenylephrine  6.25  needed for COUGH/CONGESTION    



 Mg/5 Mg/5 Ml) 5 Ml Syp Syp, 5 Ml      



 ByMouth      

 

 Triamcinolone Acet 0.025% Oint  TWICE A DAY (0900; )  17  
Discontinued



 (Triamcinolone 0.025% Oint) 80 Gm      



 Tube Tube, 1 Roxana External      







Social History







 Problem  Response  Recorded Date

 

 Recreational drugs?  N  17

 

 Alcohol?  N  17











 Query  Response  Start Date  Stop Date

 

 Smoking Status:  Never Smoker    







Hospital Discharge Instructions

No hospital discharge instructions.



Plan of Care







 Discharge Date  17

 

 Disposition  HOME/SELF CARE

 

 Condition at Discharge  STABLE

 

 Instructions/Education Provided  DI for Insect Allergy

 

 Forms Provided  Discharge Form

 

 Prescriptions  See Medications Section

 

 Additional Instructions/Education  May apply cortisone or benadryl cream on 
affected



   site







Functional Status

No functional status results.



Allergies, Adverse Reactions, Alerts

No known allergies.



Immunizations

No Known History of Immunizations.



Vital Signs







 Vital Reading  Collection Date/Time  Result

 

 Blood Pressure  17 3:13pm  141/65

 

 Temperature  17 3:13pm  98.1 F

 

 Temperature Source  17 2:30pm  Oral

 

 Respiratory Rate  17 2:30pm  18

 

 Pulse Rate  17 3:13pm  62

 

 Bedside Pulse Oximetry  17 3:13pm  96

 

 Height  17 2:30pm  5 ft 11 in

 

 Height  17 2:30pm  180.34 cm

 

 Weight  17 2:30pm  200 lb

 

 Weight  17 2:30pm  90.718 kg

 

 Body Mass Index  17 2:30pm  27.9 kg/m2







Results





[facility]                    KIRSTEN TAVERAS

[address]                                [id number]  / [id number]

[place], [place] [address]                    87  / M

Adm:





History of Present Illness



General

Chief Complaint Bite, Insect (M.ER)

Stated Complaint R ARM INSECT BITES

Date seen by MD 17

Time seen by MD 0912

Source patient

History limited by no limitations

Reviewed nurses notes, vital signs, home medications, allergies



History of Present Illness

Initial Comments

Yellow jackets stings to right upper extremity, areas of redness increasing, 
itchy and

mildly swollen. Denies throat swelling, shortness of breath or rashes elsewhere

Timing/Duration yesterday

Severity moderate

Location extremities (RUE)

Possible Cause insect sting (Yellow jacket)

Modifying Factors

worse with scratching

Associated Symptoms denies symptoms

Allergies

Coded Allergies:

No Known Drug Allergy (17)



Prescriptions

Discontinued Scripts

LIDOCAINE HCL (MOUTH-THROAT) (LIDOCAINE VISCOUS 2% ORAL SOLN) 5 ML MT [id number] 
PRN MOUTH PAIN

#1 BTL

Prov:      17

DC: 17 1458



Reported Medications

Fluconazole (DIFLUCAN 100 MG TAB) 100 MG PO DAILY

#5

ASPIRIN (ASPIR-LOW) 81 MG PO DAILY

#30 TAB

Simvastatin (ZOCOR 80 MG) 80 MG PO QHS

#30 TAB

Isosorbide Mononitrate (Imdur) 15 MG PO DAILY

Phenytoin Sodium (DILANTIN 100 MG CAP) 400 MG PO QHS

METOPROLOL TARTRATE (LOPRESSOR 25 MG TAB) 50 MG PO BID

#60 TAB

MIRTAZAPINE (MIRTAZAPINE 15 MG TAB) 15 MG PO QHS

LEVETIRACETAM (LEVETIRACETAM  MG TAB) 500 MG PO DAILY



Discontinued Reported Medications

Nystatin (Mouth-Throat) (NYSTATIN SUSPENSION (ORAL)) 5 ML MT QID

#200

DC: 17 1458







Review of Systems

Constitutional

no symptoms reported, denies chills, denies fever

EENTM

throat pain, throat swelling

Respiratory

denies cough, denies shortness of breath

Cardiovascular

denies chest pain, denies palpitations

Skin

rash

All Other Systems Reviewed and Negative



Past History

History unobtainable due to no limitations



Past Medical History

Past Medical History

Pneumonia, MI, HTN.

Other Medical Hx

HYPERLIPIDEMIA

SEIZURES



Social History

Smoking Status: Never Smoker



Physical Exam



Physical Exam

General Appearance calm, no apparent distress

EENT PERRL/EOMI, normal ENT inspection, TMs normal, pharynx normal

Neck normal inspection, appropriate ROM, non-tender, neg meningeal signs, supple
, no masses

Cardiovascular regular rate/rhythm, no edema, no JVD, no murmur/rub/gallop

Respiratory lungs clear, normal breath sounds, normal inspection, no 
respiratory distress

Gastrointestinal normal inspection, non-distended, non tender, soft, no 
organomegaly

Back normal inspection, no CVA tenderness, appropriate ROM

Extremities non-tender, normal range of motion, normal inspection

Neurologic/Psychiatric alert, appropriate mood/affect, no motor/sensory deficits
, oriented x

3

Skin Erythematous patches areas of sting, no warmth. Mild swelling, non tender

Reviewed and agree with triage nurses notes



Progress

Vitals

Vital Signs

Date Time   Temp  Pulse  Resp  B/P     B/P   Pulse  O2        O2 Flow  FiO2

Mean  Ox     Delivery  Rate

 1513  98.1     62        141/65           96

 1430  98.1     62        141/65           96



Lab and Rad Results& Orders

Details of Miscellaneous Nursing Order:



Departure



Departure

Time of Disposition 1445

Disposition HOME/SELF CARE

Clinical Impression

Primary Impression: Allergy to insect stings

Condition STABLE

Patient Instructions DI for Insect Allergy

Additional Instructions

May apply cortisone or benadryl cream on affected site



Report created by:  CHRIS [id number]

Report electronically signed by: ATILIO CUNNINGHAM [id number]<<Signature 
on File>>

Report cosigned by:





Procedures

No Known History of Procedures.



Encounters







 Encounter  Location  Arrival/Admit Date  Discharge/Depart Date  Attending



         Provider

 

 Departed  [place]  17 2:22pm  17 7:40pm  Brigida CUNNINGHAM  [facility]      











 Encounter Diagnosis  Onset Date

 

 Allergy to insect stings

## 2017-11-07 NOTE — XMS
Continuity of Care Document

 Created on:2017



Patient:KIRSTEN TAVERAS

Sex:Male

:1930

External Reference #:[id number]





Demographics







 Address  [place] 



   [place] [address]

 

 Home Phone  [phone]

 

 Preferred Language  Unknown

 

 Marital Status  Unknown

 

 Adventism Affiliation  Unknown

 

 Race  Unknown

 

 Additional Race(s)  Unavailable

 

 Ethnic Group  Unknown









Author







 Organization  [facility]









Support







 Name  Relationship  Address  Phone

 

 VIVIANA HARVEY  Caregiver  [address], [address]  [phone]



     [place] [address]  

 

 Johnnycherie Thiago  Caregiver  [address]  [phone]



     [address]  



     [place] [address]  

 

 LAURENT BYRNE  Next of Kin  [address]  [phone]



     [place] [address]  









Care Team Providers







 Name  Role  Phone

 

 Thiago Montes  Primary Care Physician  [phone]









Insurance Providers







 Payer Name  Policy Number  Subscriber Name  Relationship

 

 [employer] MED[employer]AGE PPO  [id number]  KIRSTEN TAVERAS  SELF/SAME AS PATIENT







Advance Directives







 Directive  Response  Recorded Date/Time

 

 Advance Directive?  N  17 11:04am

 

 Living Will?  Y  17 11:04am

 

 Health Care Proxy?  N  17 11:04am

 

 Healthcare Power of ?  N  17 11:04am

 

 Is the patient an Organ Donor?  Y  17 11:04am







Chief Complaint and Reason for Visit







 Reason for Visit  CHEST PAIN







Problems

Active Medical Problems







 Problem  Onset Date  Recorded Date  Status

 

 Chest pain  Unknown  16  Active

 

 CAD (coronary artery disease)  Unknown  16  Active

 

 Seizure disorder  Unknown  16  Active

 

 Hyponatremia  Unknown  16  Active

 

 Anxiety attack  Unknown  16  Active

 

 Pneumonia  Unknown  17  Active

 

 Bronchitis  Unknown  17  Active

 

 Thrush of mouth and esophagus  Unknown  17  Active

 

 Oral ulceration  Unknown  17  Active

 

 Painful mouth  Unknown  17  Active

 

 Allergy to insect stings  Unknown  17  Active

 

 Angina pectoris  Unknown  17  Active

 

 CHF (congestive heart failure), NYHA class III  Unknown  17  Active





Active Surgical Problems







 Problem  Onset Date  Recorded Date  Status

 

 S/P CABG (coronary artery bypass graft)  Unknown  16  Active







Medications

Current Home Medications







 Medication  Dose  Units  Route  Directions  Days/Qty  Instructions  Start



               Date

 

 ASPIRIN  81  MG  By Mouth  EVERY DAY @  30    



 (ASPIR-LOW) 81 MG        0900      



 TAB              

 

 Fluconazole  100  MG  By Mouth  EVERY DAY @  5    



 (DIFLUCAN 100 MG        0900      



 TAB) 100 MG TAB              

 

 ISOSORBIDE  30  MG  By Mouth  DAILY HOUR OF  30  1 TAB AT BEDTIME  17



 MONONITRATE        SLEEP      



 (ISOSORBIDE              



 MONONITRATE ER 30              



 MG TAB) 30 MG TAB              

 

 Isosorbide  15  MG  By Mouth  EVERY DAY @      



 Mononitrate        0900      



 (Imdur) 30 MG TAB              

 

 LEVETIRACETAM  500  MG  By Mouth  EVERY DAY @      



 (LEVETIRACETAM ER        0900      



 500 MG TAB) 500              



 MG TAB              

 

 METOPROLOL  50  MG  By Mouth  TWICE A DAY  60    



 TARTRATE        (0900; )      



 (LOPRESSOR 25 MG              



 TAB) 25 MG TAB              

 

 MIRTAZAPINE  15  MG  By Mouth  AT BEDTIME      



 (MIRTAZAPINE 15        (2100)      



 MG TAB) 15 MG TAB              

 

 Phenytoin Sodium  400  MG  By Mouth  AT BEDTIME      



 (DILANTIN 100 MG        ()      



 CAP) 100 MG CAP              

 

 Simvastatin  80  MG  By Mouth  AT BEDTIME  30    



 (ZOCOR 80 MG) 80        (2100)      



 MG TAB              





Past Home Medications







 Medication  Directions  Ordered  Status

 

 Acetaminophen W/Codeine #3  EVERY EIGHT HOURS AS NEEDED as  09/13/15  
Discontinued



 (Tylenol/Codeine #3 Tab) 300  needed for PAIN    



 Mg/30 Mg Tab Tab, 1 Tab By Mouth      

 

 Azithromycin (Azithromycin 500 Mg  EVERY DAY @ 0900  17  Discontinued



 Tab) 500 Mg Tab Tab, 500 Mg By      



 Mouth      

 

 Desloratadine (Desloratadine 5 Mg  EVERY DAY @ 0900  17  Discontinued



 Odt Tab) 5 Mg Tab Tab, 5 Mg By      



 Mouth      

 

 Lidocaine Hcl (Mouth-Throat)  EVERY SIX HOURS AS NEEDED as  17  
Discontinued



 (Lidocaine Viscous 2% Oral Soln)  needed for MOUTH PAIN    



 2 % Sol Sol, 5 Ml Mouth / Throat      

 

 Nystatin (Mouth-Throat) (Nystatin  FOUR TIMES A DAY (0900)  Unknown  
Discontinued



 Suspension (Oral)) 100,000      



 Units/Ml Sonia Sonia, 5 Ml Mouth /      



 Throat      

 

 Prednisone (Prednisone 20 Mg Tab)  EVERY DAY @ 0900  17  Discontinued



 20 Mg Tab Tab, 20 Mg By Mouth      

 

 Promethazine & Phenyl 6.25/5MG  EVERY SIX HOURS AS NEEDED as  17  
Discontinued



 (Promethazine-Phenylephrine  6.25  needed for COUGH/CONGESTION    



 Mg/5 Mg/5 Ml) 5 Ml Syp Syp, 5 Ml      



 ByMouth      

 

 Triamcinolone Acet 0.025% Oint  TWICE A DAY (0900; 2100)  17  
Discontinued



 (Triamcinolone 0.025% Oint) 80 Gm      



 Tube Tube, 1 Roxana External      







Social History







 Problem  Response  Recorded Date

 

 Recreational drugs?  N  17

 

 Alcohol?  N  17











 Query  Response  Start Date  Stop Date

 

 Smoking Status:  Never Smoker    







Hospital Discharge Instructions

No hospital discharge instructions.



Plan of Care







 Discharge Date  17

 

 Disposition  OTHER ACUTE CARE FACILITY

 

 Condition at Discharge  IMPROVED

 

 Forms Provided  Discharge Form

 

 Prescriptions  See Medications Section

 

 Referrals  Thiago Montes -







Functional Status

No functional status results.



Allergies, Adverse Reactions, Alerts

No known allergies.



Immunizations

No Known History of Immunizations.



Vital Signs







 Vital Reading  Collection Date/Time  Result

 

 Blood Pressure  17 4:26pm  152/110

 

 Temperature  17 4:26pm  97.4 F

 

 Temperature Source  17 10:56am  Oral

 

 Respiratory Rate  17 4:19pm  14

 

 Pulse Rate  17 4:26pm  65

 

 Bedside Pulse Oximetry  17 4:26pm  97

 

 Height  17 10:56am  5 ft 10 in

 

 Height  17 10:56am  177.8 cm

 

 Weight  17 10:56am  200 lb

 

 Weight  17 10:56am  90.718 kg

 

 Body Mass Index  17 10:56am  28.7 kg/m2







Results

Laboratory Results







 Test Name  Result  Units  Flags  Reference  Collection  Result  Comments



           Date/Time  Date/Time  

 

 White Blood Count  6.9  X 10^3    4.8-10.8  17  



           11:00am  11:10am  

 

 Red Blood Count  3.91  X 10^6  L  4.70-6.00  17  



           11:00am  11:10am  

 

 Hemoglobin  12.1  g/dL  L  13.5-17.5  17  



           11:00am  11:10am  

 

 Hematocrit  38.9  %  L  42.0-52.0  17  



           11:00am  11:10am  

 

 Mean Corpuscular  99.5  fl    80.0-100.0  17  



 Volume          11:00am  11:10am  

 

 Mean Corpuscular  30.9  pg    27.0-31.0  17  



 Hemoglobin          11:00am  11:10am  

 

 Mean Corpuscular  31.1  g/dl  L  32.0-36.0  17  



 Hgb Concent Diff          11:00am  11:10am  

 

 Red Cell  12.9  %    11.5-14.5  17  



 Distribution Width          11:00am  11:10am  

 

 Platelet Count  224  X 10^3    130-400  17  



           11:00am  11:10am  

 

 Mean Platelet  7.3  fl  L  7.4-10.4  17  



 Volume          11:00am  11:10am  

 

 Granulocytes (%)  45.0  %  L  50.0-75.0  17  



           11:00am  11:10am  

 

 Lymphocytes %  43.1  %  H  20.0-40.0  17  



           11:00am  11:10am  

 

 Mid Range Cells %  11.9  %    0.1-24.0  17  



 (auto)          11:00am  11:10am  

 

 Granulocytes #  3.1  X 10^3    1.8-6.4  17  



           11:00am  11:10am  

 

 Lymphocytes #  3.0  X 10^3    1.2-3.6  17  



           11:00am  11:10am  

 

 Mid Range Cells #  0.8  X 10^3    0.0-1.8  17  



 (auto)          11:00am  11:10am  

 

 Manual  NO        17  



 Differential          11:00am  11:10am  

 

 Sodium Level  135  mmol/L    135-144  17  



           11:00am  11:19am  

 

 Potassium Level  4.8  mmol/L    3.5-5.1  17  



           11:00am  11:19am  

 

 Chloride Level  103  mmol/L    101-111  17  



           11:00am  11:19am  

 

 Carbon Dioxide  30  mmol/L    22-32  17  



 Level          11:00am  11:19am  

 

 Anion Gap  6.8  mmol/L  L  10-20  17  



           11:00am  11:19am  

 

 Random Glucose  90  mg/dL      17  Random glucose > 200 
mg/dL in a patient with typical



           11:00am  11:19am  symptoms of diabetes (polydipsia, polyuria and 
unexplained



               weight loss) satisfies ADA criteria for diabetes mellitus if



               confirmed by repeat testing on another day.



               



               Confirmation is unnecessary when acute metabolic



               decompensation with hyperglycemia is manifested.



               



               Reference:  Report of the Expert Committee on the Diagnosis



               and Classification of Diabetes Mellitus. Diabetes Care,



               20:1183, 1997.

 

 Creatinine  1.0  mg/dL    0.61-1.24  17  



           11:00am  11:19am  

 

 EGFR Note  70.7      59.3-175.8  17  eGFR (Estimated 
Glomerular Filtration Rate)



           11:00am  11:19am  Reference Range:  >60 ml/min/1.73m



               



               eGFR calculation value obtained using the MDRD equation. The



               reportable reference ranges is recommended to be greater



               than 60 ml/min/1.73m. This is an estimation of the patient's



               GFR and clinical correlation is recommended.

 

 Blood Urea  10  mg/dL    8-26  17  



 Nitrogen          11:00am  11:19am  

 

 Calcium Level  8.2  mg/dL  L  8.9-10.3  17  



           11:00am  11:19am  

 

 Magnesium Level  2.2  mg/dL    1.8-2.5  17  



           11:00am  11:19am  

 

 Creatine Kinase  94  IU/L      17  



           11:00am  11:32am  

 

 Albumin  3.5  g/dL    3.5-5.0  17  



           11:00am  11:19am  

 

 Total Bilirubin  0.3  mg/dL    0.3-1.2  17  



           11:00am  11:19am  

 

 Alkaline  72  IU/L    32-91  17  



 Phosphatase          11:00am  11:19am  

 

 Total Protein  6.9  g/dL    6.5-8.1  17  



           11:00am  11:19am  

 

 Alanine  10  IU/L    7-55  17  



 Aminotransferase          11:00am  11:19am  



 (ALT/SGPT)              

 

 Aspartate Amino  22  IU/L    15-41  17  



 Transf (AST/SGOT)          11:00am  11:19am  

 

 Globulin  3.4  g/dL    2.3-3.5  17  



           11:00am  11:19am  

 

 Albumin/Globulin  1.0    L  1.2-2.2  17  



 Ratio          11:00am  11:19am  

 

 B-Type Natriuretic  97  pg/mL  H  0-50  17  



 Peptide          11:00am  11:19am  

 

 Troponin I  0.01  ng/mL    0.00-0.03  17  



           11:00am  11:19am  Troponin I-Interpretation Reference :



               



               <0.03  ng/mL       NEGATIVE



               



               0.04 - 0.49 ng/mL  EQUIVOCAL



               



               =OR > 0.5 ng/mL   CONSISTENT WITH ACUTE MYOCARDIAL INJURY



               



               98% of confirmed AMI patients will have at least one value



               in a set or serial specimens >0.50 ng/ml.



               



               99% of normals are between 0.0 - 0.10 ng/ml.



               



               Serial samples on a patient that are all <0.10 ng/ml



               effectively rules out AMI.



               



               Persistently increased troponin I values that are above the



               upper limit of normal but below the threshold for AMI



               indicate mycardial injury but not necessarily an ischemic



               mechanism of injury.



               



               *** Troponin Important Points ***



               



               1. Troponin is specific for myocardial injury but not for



               AMI.  Elevated troponin levels above the upper limit of



               normal but below the AMI cutoff may be present in cardiac



               injury other then AMI and represent some degree of risk.



               



               2.  Elevated troponin levels inconsistent with patient



               history or clinical condition should be considered a sign to



               investigate for other cardiac conditions.



               



               3.  Serial sampling is critical for accurate diagnosis.







[facility]                    KIRSTEN TAVERAS NORI

[address]                                [id number]  / [id number]

[place], [place] [address]                    87  / M

Adm:





History of Present Illness



General

Chief Complaint CHEST PAIN (M.ER)

Stated Complaint CHEST PAIN

Date seen by MD 17

Time seen by MD 1052

Source patient, EMS

History limited by no limitations

Reviewed nurses notes, vital signs, home medications, allergies, EMS notes



History of Present Illness

Initial Comments

Sudden onset of chest tightness since 2 AM . did not seek help until morning 
after taking

usual AM meds, Chest tightness persisted. EMS gave ASA and 1 spray of NTG. 
Chest tightness

and SOB inproved to 2/10. Came to ER fully alert NAD. Still mild chest 
discomfort. No SOB.

Denies other pain.

Allergies

Coded Allergies:

No Known Drug Allergy (17)



Prescriptions

Reported Medications

Fluconazole (DIFLUCAN 100 MG TAB) 100 MG PO DAILY

#5

ASPIRIN (ASPIR-LOW) 81 MG PO DAILY

#30 TAB

Simvastatin (ZOCOR 80 MG) 80 MG PO QHS

#30 TAB

Isosorbide Mononitrate (Imdur) 15 MG PO DAILY

Phenytoin Sodium (DILANTIN 100 MG CAP) 400 MG PO QHS

METOPROLOL TARTRATE (LOPRESSOR 25 MG TAB) 50 MG PO BID

#60 TAB

MIRTAZAPINE (MIRTAZAPINE 15 MG TAB) 15 MG PO QHS

LEVETIRACETAM (LEVETIRACETAM  MG TAB) 500 MG PO DAILY







Review of Systems

Constitutional

see HPI, malaise, weakness

EENTM

no symptoms reported, see HPI

Respiratory

SOB with exertion

Cardiovascular

see HPI, other (chest tightness)

Gastrointestinal

no symptoms reported

Genitourinary

no symptoms reported

Musculoskeletal

no symptoms reported

Skin

no symptoms reported

Psychiatric/Neurological

no symptoms reported

Endocrine

no symptoms reported

Hematologic/Lymphatic

no symptoms reported

All Other Systems Reviewed and Negative



Past History

History unobtainable due to no limitations



Past Medical History

Past Medical History

Pneumonia, CHF, CAD, MI, HTN.

Other Medical Hx

HYPERLIPIDEMIA

SEIZURES

Surgical History coronary bypass surgery, open heart surgery



Social History

Smoking Status: Never Smoker



Physical Exam



Physical Exam

General Appearance calm, no apparent distress

EENT PERRL/EOMI, normal ENT inspection

Neck normal inspection, appropriate ROM, non-tender, neg meningeal signs, supple
, no masses,

carotid bruit, JVD

Respiratory lungs clear, normal breath sounds, normal inspection, no 
respiratory distress,

decreased breath sounds

Cardiovascular regular rate/rhythm, no murmur/rub/gallop, legs edema +1 
bilateral

Peripheral Pulses

2+ carotid (R), 2+ carotid (L), 2+ dorsalis pedis (R), 2+ dorsalis pedis (L)

Gastrointestinal normal inspection, non-distended, non tender, soft, no 
organomegaly

Extremities non-tender, normal range of motion, normal inspection

Neurologic/Psychiatric alert, appropriate mood/affect, no motor/sensory deficits
, oriented x

3

Skin normal color, normal inspection, warm/dry

Reviewed and agree with triage nurses notes



Progress

Progress

Improved , CHEST PAIN RESOLVED AFTER TWO NTG. WEAKNESS AND SOMNOLENT.

Vitals

Vital Signs

Date Time   Temp  Pulse  Resp  B/P     B/P   Pulse  O2        O2 Flow  FiO2

Mean  Ox     Delivery  Rate

 1330           50    23  159/77

 1300           48    14  142/76

 1230           50    20  159/77           96

 1200           48    17  142/76           96

 1130           55    18  135/73           94

 1100           50    20  133/72           95

 1056  97.4     53        132/73           96

 1052                                      96





Lab and Rad Results& Orders

Laboratory Tests

  Range/Units

1100

Chemistry

Sodium                      135    135 - 144 mmol/L

Potassium                   4.8    3.5 - 5.1 mmol/L

Chloride                    103    101 - 111 mmol/L

Carbon Dioxide               30      22 - 32 mmol/L

Anion Gap                6.8  L      10 - 20 mmol/L

BUN                          10        8 - 26 mg/dL

Creatinine                  1.0   0.61 - 1.24 mg/dL

Random Glucose               90      70 - 109 mg/dL

Calcium                  8.2  L    8.9 - 10.3 mg/dL

Magnesium                   2.2     1.8 - 2.5 mg/dL

Total Bilirubin             0.3     0.3 - 1.2 mg/dL

AST                          22        15 - 41 IU/L

ALT                          10         7 - 55 IU/L

Alkaline Phosphatase         72        32 - 91 IU/L

Creatine Kinase              94       49 - 397 IU/L

Troponin I                 0.01   0.00 - 0.03 ng/mL

B-Natriuretic Peptide     97  H        0 - 50 pg/mL

Total Protein               6.9      6.5 - 8.1 g/dL

Albumin                     3.5      3.5 - 5.0 g/dL

Globulin                    3.4      2.3 - 3.5 g/dL

Albumin/Globulin Ratio   1.0  L           1.2 - 2.2

EGFR Note                  70.7        59.3 - 175.8

Hematology

WBC                         6.9   4.8 - 10.8 X 10^3

RBC                     3.91  L  4.70 - 6.00 X 10^6

Hgb                     12.1  L    13.5 - 17.5 g/dL

Hct                     38.9  L       42.0 - 52.0 %

MCV                        99.5     80.0 - 100.0 fl

MCH                        30.9      27.0 - 31.0 pg

MCHC Differential       31.1  L    32.0 - 36.0 g/dl

RDW                        12.9       11.5 - 14.5 %

Plt Count                   224    130 - 400 X 10^3

MPV                      7.3  L       7.4 - 10.4 fl

Gran %                  45.0  L       50.0 - 75.0 %

Mid Range % (Auto)         11.9        0.1 - 24.0 %

Gran #                      3.1    1.8 - 6.4 X 10^3

Mid Range #                 0.8    0.0 - 1.8 X 10^3

Manual Differential     NO

Lymphocytes %           43.1  H       20.0 - 40.0 %

Lymphocytes #               3.0    1.2 - 3.6 X 10^3



Orders

Procedure                      Date/time   Status

Z CHEST 1 VIEW                  1100  Complete

Z TROPONIN                      1057  Complete

Z MG (MAGNESIUM)                1057  Complete

Z CMP (Comp Metabolic Panel)    1057  Complete

Z CK (CREATINE PHOSPHOKINASE)   1057  Complete

Z CBC                           1057  Complete

Z BNP                           1057  Complete

VITAL SIGNS                     1057  Active

SALINE LOCK/FLUSH               1057  Active

PULSE OXIMETRY MONITOR          1057  Active

OXYGEN PER HOUR                 1057  Active

CARDIAC MONITOR                 1057  Active

ELECTROCARDIOGRAM               1057  Active



Details of Miscellaneous Nursing Order:

CARDIOMEGALY, NO INFILTRATION

EKG NSR, Sinus Bradycardia rate 49. LAD, 1st AV block.

Time PCP or Consult Called 1300

PCP or Consult w/Reason LINDA FRENCH EDWARD, ER AT [facility] ACCEPTED 
TRANSFER FOR TELE

OBS.



Departure



Departure

Time of Disposition 1227

Disposition OTHER ACUTE CARE FACILITY

Clinical Impression

Primary Impression: Angina pectoris

Secondary Impressions:

CHF (congestive heart failure), NYHA class III

Qualifiers:  Congestive heart failure type: diastolic Congestive heart failure 
chronicity:

chronic Qualified Code: I50.32 - Chronic diastolic (congestive) heart failure



Admission Status Acute Inpatient

Condition IMPROVED

Prescriptions

Current Visit Scripts

ISOSORBIDE MONONITRATE (ISOSORBIDE MONONITRATE ER 30 MG TAB) 30 MG PO DAILY HS

#30 TAB

1 TAB AT BEDTIME







Report created by:  CHRISTIAN 17 [id number]

Report electronically signed by: VIVIANA HARVEY 17 [id number]<<Signature on 
File>>

Report cosigned by:





Procedures

No Known History of Procedures.



Encounters







 Encounter  Location  Arrival/Admit Date  Discharge/Depart Date  Attending



         Provider

 

 Departed  [place]  17 10:52am  17 3:40pm  [facility]      

 

 Departed  [place]  17 2:22pm  [facility] 7[facility]40[facility]      











 Encounter Diagnosis  Onset Date

 

 Angina pectoris  

 

 CHF (congestive heart failure), NYHA class III

## 2017-11-07 NOTE — XMS
Continuity of Care Document

 Created on:2017



Patient:KIRSTEN TAVERAS

Sex:Male

:1930

External Reference #:[id number]





Demographics







 Address  [address] 



   [place] [address]

 

 Phone  [phone]

 

 Preferred Language  Unknown

 

 Marital Status  Unknown

 

 Taoism Affiliation  Unknown

 

 Race  Unknown

 

 Additional Race(s)  Unavailable

 

 Ethnic Group  Unknown









Author







 Organization  [facility]









Support







 Name  Relationship  Address  Phone

 

 VIVIANA HARVEY  Caregiver  [id number] , [address]  Unavailable



     [place] [address]  

 

 Thiago Montes  Caregiver  [address]  Unavailable



     [address]  



     [place] [address]  

 

 BERKLEY TAVERAS  Next of Kin  [address]   Unavailable



     [place] [address]  









Care Team Providers







 Name  Role  Phone

 

 Thiago Montes  Primary Care Physician  Unavailable









Insurance Providers







 Payer Name  Policy Number  Subscriber Name  Relationship

 

 [employer] MEDICARE ADVANTAGE PPO  [id number]  KIRSTEN TAVERAS  SELF/SAME AS PATIENT







Advance Directives







 Directive  Response  Recorded Date/Time

 

 Advance Directive?  N  17 1:47pm

 

 Living Will?  Y  17 1:47pm

 

 Health Care Proxy?  N  17 1:47pm

 

 Healthcare Power of ?  N  17 1:47pm

 

 Is the patient an Organ Donor?  N  17 1:47pm







Chief Complaint and Reason for Visit







 Reason for Visit  SORE THROAT







Problems

Active Medical Problems







 Problem  Onset Date  Recorded Date  Status

 

 Chest pain  Unknown  16  Active

 

 CAD (coronary artery disease)  Unknown  16  Active

 

 Seizure disorder  Unknown  16  Active

 

 Hyponatremia  Unknown  16  Active

 

 Anxiety attack  Unknown  16  Active

 

 Pneumonia  Unknown  17  Active

 

 Bronchitis  Unknown  17  Active





Active Surgical Problems







 Problem  Onset Date  Recorded Date  Status

 

 S/P CABG (coronary artery bypass graft)  Unknown  16  Active







Medications

Current Home Medications







 Medication  Dose  Units  Route  Directions  Days/Qty  Instructions  Start Date

 

 ASPIRIN  81  MG  PO  EVERY DAY @  30    



 (ASPIR-LOW) 81 MG        0900      



 TAB              

 

 AZITHROMYCIN  500  MG  PO  EVERY DAY @  5    17



 (AZITHROMYCIN 500        0900      



 MG TAB) 500 MG              



 TAB              

 

 Isosorbide  15  MG  PO  EVERY DAY @      



 Mononitrate        0900      



 (Imdur) 30 MG TAB              

 

 METOPROLOL  25  MG  PO  TWICE A DAY  60    



 TARTRATE        (0900; 2100)      



 (LOPRESSOR 25 MG              



 TAB) 25 MG TAB              

 

 Phenytoin Sodium  400  MG  PO  AT BEDTIME      



 (DILANTIN 100 MG        (2100)      



 CAP) 100 MG CAP              

 

 Prednisone  20  MG  PO  EVERY DAY @  5  2 tabs po daily  17



 (PREDNISONE 20 MG        0900    for 2 days then 1  



 TAB) 20 MG TAB            tab po  

 

 Promethazine &  5  ML  PO  EVERY SIX HOURS  100    17



 Phenyl 6.25/5MG        AS NEEDED PRN      



 (PROMETHAZINE-PHE        COUGH/CONGESTIO      



 NYLEPHRINE  6.25        N      



 MG/5 MG/5 ML) 5              



 ML SYP              

 

 Simvastatin  80  MG  PO  AT BEDTIME  30    



 (ZOCOR 80 MG) 80        (2100)      



 MG TAB              





Past Home Medications







 Medication  Directions  Ordered  Status

 

 Acetaminophen W/Codeine #3  EVERY EIGHT HOURS AS NEEDED  09/13/15  Discontinued



 (Tylenol/Codeine #3 Tab) 300  PRN PAIN    



 Mg/30 Mg Tab Tab, 1 Tab Po      







Social History







 Problem  Response  Recorded Date

 

 Recreational drugs?  N  17

 

 Alcohol?  N  17











 Query  Response  Start Date  Stop Date

 

 Smoking Status:  Never Smoker    







Hospital Discharge Instructions

No hospital discharge instructions.



Plan of Care







 Discharge Date  17

 

 Disposition  HOME/SELF CARE

 

 Condition at Discharge  STABLE

 

 Instructions/Education Provided  Pneumonia-Adult

 

 Forms Provided  Discharge Form



   Work Release

 

 Prescriptions  See Medications Section

 

 Referrals  Thiago Montes -







Functional Status

No functional status results.



Allergies, Adverse Reactions, Alerts

No known allergies.



Immunizations

No Known History of Immunizations.



Vital Signs







 Vital Reading  Collection Date/Time  Result

 

 Blood Pressure  17 3:25pm  112/54

 

 Blood Pressure Source  17 2:30pm  Auto Cuff

 

 Patient Temperature  17 3:25pm  98.4

 

 Temperature Source  17 1:30pm  Oral

 

 Respiratory Rate  17 2:30pm  18

 

 Pulse Rate  17 3:25pm  80

 

 Pulse Location  17 2:30pm  Monitor

 

 Bedside Pulse Oximetry  17 3:25pm  96

 

 Height  17 1:30pm  177.80 cm

 

 Height  17 1:30pm  5 ft 10.00 in

 

 Weight  17 1:30pm  90.718 kg

 

 Weight  17 1:30pm  200 lb 0.00 oz

 

 Body Mass Index  17 1:30pm  28.7







Results

Laboratory Results







 Test Name  Result  Units  Flags  Reference  Collection  Result  Comments



           Date/Time  Date/Time  

 

 White Blood Count  9.1  X 10^3    4.8-10.8  17  



           1:50pm  2:41pm  

 

 Red Blood Count  4.21  X 10^6  L  4.70-6.00  17  



           1:50pm  2:41pm  

 

 Hemoglobin  13.9  g/dL    13.5-17.5  17  



           1:50pm  2:41pm  

 

 Hematocrit  41.1  %  L  42.0-52.0  17  



           1:50pm  2:41pm  

 

 Mean Corpuscular  97.6  fl    80.0-100.0  17  



 Volume          1:50pm  2:41pm  

 

 Mean Corpuscular  33.0  pg  H  27.0-31.0  17  



 Hemoglobin          1:50pm  2:41pm  

 

 Mean Corpuscular  33.8  g/dl    32.0-36.0  17  



 Hgb Concent Diff          1:50pm  2:41pm  

 

 Red Cell  13.4  %    11.5-14.5  17  



 Distribution Width          1:50pm  2:41pm  

 

 Platelet Count  224  X 10^3    130-400  17  



           1:50pm  2:41pm  

 

 Mean Platelet  7.3  fl  L  7.4-10.4  17  



 Volume          1:50pm  2:41pm  

 

 Granulocytes (%)  68.7  %    50.0-75.0  17  



           1:50pm  2:41pm  

 

 Lymphocytes %  23.8  %    20.0-40.0  17  



           1:50pm  2:41pm  

 

 MID%  7.5  %    0.1-24.0  17  



           1:50pm  2:41pm  

 

 Granulocytes #  6.3  X 10^3    1.8-6.4  17  



           1:50pm  2:41pm  

 

 Lymphocytes #  2.2  X 10^3    1.2-3.6  17  



           1:50pm  2:41pm  

 

 MID#  0.7  X 10^3    0.0-1.8  17  



           1:50pm  2:41pm  

 

 Manual  NO        17  



 Differential          1:50pm  2:41pm  

 

 Sodium Level  137  mmol/L    135-144  17  



           1:50pm  2:42pm  

 

 Potassium Level  5.0  mmol/L    3.5-5.1  17  



           1:50pm  2:42pm  

 

 Chloride Level  99  mmol/L  L  101-111  17  



           1:50pm  2:42pm  

 

 Carbon Dioxide  28  mmol/L    22-32  17  



 Level          1:50pm  2:42pm  

 

 Anion Gap  15.0  mmol/L    10-20  17  



           1:50pm  2:42pm  

 

 Random Glucose  122  mg/dL  H    17  Random glucose > 
200 mg/dL in a patient with typical



           1:50pm  2:42pm  symptoms of diabetes (polydipsia, polyuria and 
unexplained



               weight loss) satisfies ADA criteria for diabetes mellitus if



               confirmed by repeat testing on another day.



               



               Confirmation is unnecessary when acute metabolic



               decompensation with hyperglycemia is manifested.



               



               Reference:  Report of the Expert Committee on the Diagnosis



               and Classification of Diabetes Mellitus. Diabetes Care,



               20:1183, 1997.

 

 Creatinine  1.1  mg/dL    0.61-1.24  17  



           1:50pm  2:42pm  

 

 EGFR Note  > 60.0        17  eGFR (Estimated Glomerular 
Filtration Rate)



           1:50pm  2:42pm  Reference Range:  >60 ml/min/1.73m



               



               eGFR calculation value obtained using the MDRD equation. The



               reportable reference ranges is recommended to be greater



               than 60 ml/min/1.73m. This is an estimation of the patient's



               GFR and clinical correlation is recommended.

 

 Blood Urea  13  mg/dL    8-26  17  



 Nitrogen          1:50pm  2:42pm  

 

 Calcium Level  8.9  mg/dL    8.9-10.3  17  



           1:50pm  2:42pm  

 

 Albumin  3.7  g/dL    3.5-5.0  17  



           1:50pm  2:42pm  

 

 Total Bilirubin  0.5  mg/dL    0.3-1.2  17  



           1:50pm  2:42pm  

 

 Alkaline  82  IU/L    32-91  17  



 Phosphatase          1:50pm  2:42pm  

 

 Total Protein  7.4  g/dL    6.5-8.1  17  



           1:50pm  2:42pm  

 

 Alanine  17  IU/L    7-55  17  



 Aminotransferase          1:50pm  2:42pm  



 (ALT/SGPT)              

 

 Aspartate Amino  21  IU/L    15-41  17  



 Transf (AST/SGOT)          1:50pm  2:42pm  

 

 Globulin  3.7  g/dL  H  2.3-3.5  17  



           1:50pm  2:42pm  

 

 Albumin/Globulin  1.0    L  1.2-2.2  17  



 Ratio          1:50pm  2:42pm  

 

 Group A  NEGATIVE      NEGATIVE  17  Negative screens will be 
confirmed by culture. Please see



 Streptococcus          1:34pm  1:53pm  separate microbiology report for these 
results.



 Screen              

 

 Influenza Type A  NEGATIVE      NEGATIVE  17  A negative test 
result should be interpreted as a



 Antigen          1:34pm  1:53pm  presumptive negative for the presense of 
influenza A



               antigen.



               



               Negative results can occur from inadequate sample collection



               or levels of antigen which fall below the limits of



               detection of the test.

 

 Influenza Type B  NEGATIVE      NEGATIVE  17  A negative test 
result should be interpreted as a



 Antigen          1:34pm  1:53pm  presumptive negative for the presense of 
influenza B



               antigen.



               



               Negative results can occur from inadequate sample collection



               or levels of antigen which fall below the limits of



               detection of the test.







Procedures

No Known History of Procedures.



Encounters







 Encounter  Location  Arrival/Admit Date  Discharge/Depart Date  Attending



         Provider

 

 Departed  [place]  17 1:27pm  17 3:25pm  [facility],



 [facility]      











 Encounter Diagnosis

 

 Pneumonia

 

 Bronchitis

## 2017-11-08 NOTE — DIS
PRIMARY CARE PHYSICIAN:  Dr. Montes.

 

PRIMARY CARDIOLOGIST:  Dr. Lujan.

 

DATE OF ADMISSION:  11/07/2017

 

DATE OF DISCHARGE:  11/08/2017

 

DISCHARGE DIAGNOSES:

1.  Noncardiac chest pain.

2.  History of coronary artery disease.

3.  Hypertension.

4.  History of seizure disorder.

5.  Obstructive sleep apnea.

 

CONSULTATIONS:  None.

 

PROCEDURES:  Nuclear stress test on 11/08/2017 that showed transient ischemic dilation, no reversibl
e or fixed defects, otherwise negative study.

 

HOSPITAL COURSE:  Mr. Del Rosario is a pleasant 87-year-old white gentleman with hypertension and coronary
 artery disease, who presented to the emergency department late last evening on 11/07/2017 for chest
 pressure.  He felt like he was having an anxiety attack and following uncomfortable feeling in his 
chest, but denied any sharp pains.  No nausea, vomiting, no diaphoresis, no sweats.  He did not have
 any shortness of breath or feel lightheaded.

 

He was brought to the emergency department, where workup was negative.  We were called for admission
.

 

HOSPITAL COURSE:  The patient was seen and examined and placed in observation by Dr. Espino.  Serial
 cardiac biomarkers were obtained, which were unremarkable.  Initial labs were unremarkable.  Nuclea
r stress test was ordered, which was completed earlier this afternoon.  Reading showed transient isc
hemic dilation, but no fixed or reversible defects and a normal ejection fraction.  The patient was 
stable for discharge with outpatient followup.

 

PHYSICAL EXAM:  The patient was seen and examined on the day of discharge.

 

Discharge plan and disposition was discussed with the patient face-to-face at the bedside.

 

DISCHARGE MEDICATIONS:

1.  Aspirin 81 mg daily.

2.  Fluconazole 100 mg daily.

3.  Isosorbide mononitrate 30 mg daily.

4.  Keppra 500 mg p.o. b.i.d.

5.  Lopressor 25 mg p.o. b.i.d.

6.  Mirtazapine 15 mg p.o. daily.

7.  Dilantin 400 mg p.o. at bedtime.

6.  Zocor 80 mg p.o. at bedtime.

 

FOLLOWUP APPOINTMENTS

1.  Primary care physician within a week.

2.  Cardiology in 1-2 weeks.

 

DISCHARGE CONDITION:  Stable.

 

DISPOSITION:  Being discharged to home via private vehicle.

 

Instructions to return to the emergency department for worsening or persistent chest pain.

## 2017-11-08 NOTE — HP
PRIMARY CARE PHYSICIAN:  Thiago Montes M.D.

 

CHIEF COMPLAINT:  Chest pressure.

 

HISTORY OF PRESENT ILLNESS:  Mr. Del Rosario is a very pleasant 87-year-old gentleman who has a history of
 coronary artery disease.  He has had coronary artery bypass surgery, he believes back in .  He 
says that he was in his usual state of health until about 2:00 a.m., this previous morning, he awoke
 feeling \\"very anxious.\\"  He felt like he was having an anxiety attack.  He felt uncomfortable i
n his chest like there was some pressure there, but he says there was no pain; however, and he denie
s any nausea or vomiting.  He denies any shortness of breath or feeling dizzy or lightheaded.  He th
ought he might be having a panic attack because his wife recently  about 3 months ago and then a
lso he has a son who has been in the hospital for about a week and he thinks it could be due to all 
the things that are going on in his life.  However, he came to the ER just to be on the safe side.  
He says that the symptoms are not similar to his previous episode of coronary artery disease, but he
 does not have a clear memory that.  He also denies any dizziness or lightheadedness, no cough or co
ngestion.  He has admitted to some diarrhea off and on for the past 2 weeks, but he says this looks 
like it is about to resolve.

 

REVIEW OF SYSTEMS:  CONSTITUTIONAL:  There have been no fevers, chills, no night sweats, no weight l
oss.  HEENT:  No headaches, no dizziness, no visual changes, no sore throat, rhinorrhea, neck pain, 
no adenopathy.  PULMONARY:  No hemoptysis, no cough, no wheezing.  CARDIOVASCULAR:  As the history o
f present illness.  There has been no PND, no orthopnea.  No lower extremity edema.  GASTROINTESTINA
L:  No abdominal pain, no nausea, no vomiting.  He has had some diarrhea off and on, but is improvin
g.  GENITOURINARY:  No urinary frequency, hematuria, no hesitancy.  NEUROLOGIC:  No focal weakness, 
numbness, no seizures.  PSYCHIATRIC:  No symptoms of anxiety or depression.  SKIN AND INTEGUMENT:  N
o skin changes.  No rash.

 

PAST MEDICAL HISTORY:  Significant for coronary artery disease, seizure disorder, and obstructive sl
eep apnea.

 

PAST SURGICAL HISTORY:  He has had right knee surgery and right ankle surgery as well as bypass surg
leticia.

 

ALLERGIES:  No known drug allergies.

 

SOCIAL HISTORY:  He is recently  for the second time.  He said his first wife  of cancer 
about 25 years ago and now his second wife has recently  of cancer too.  He is a nonsmoker and n
ondrinker.

 

FAMILY HISTORY:  No history of any inheritable diseases.

 

CURRENT MEDICATIONS:  Include Dilantin 400 mg at bedtime, Remeron 15 mg daily, Keppra 500 mg twice a
 day, aspirin 81 mg daily, fluconazole 100 mg daily, isosorbide mononitrate extended release 30 mg d
aily, simvastatin 80 mg at bedtime, and metoprolol tartrate 25 mg twice a day.

 

PHYSICAL EXAMINATION:

GENERAL:  He is alert and oriented.  He appears to be in no acute distress.

VITAL SIGNS:  His blood pressure was 138/75, heart rate 53, respiratory rate of 18, and temperature 
is 97.5.

HEENT:  Pupils are equal, round, and reactive.  Extraocular muscles are intact.  His sclerae are ani
cteric.  Throat, no erythema, no exudates.

NECK:  No adenopathy, no bruits.

LUNGS:  Clear.  No wheezing, no rales.

CARDIOVASCULAR:  He has a normal S1, S2.  There is no S3 or S4.  No murmurs, clicks or rubs.

ABDOMEN:  Obese, it is soft, nontender, nondistended.  Positive for bowel sounds.  No rebound, no gu
arding.

EXTREMITIES:  There is no edema.

NEUROLOGIC:  Neurologically, the exam is nonfocal.

 

LABORATORY DATA AND IMAGING:  Sodium 140, potassium 4.3, chloride is 104, CO2 is 27, BUN of 14, crea
tinine 1.06, and glucose is 93.  White blood cell count 7.3, hemoglobin 13.1, hematocrit is 40.1, pl
atelet count is 228.  The patient also had a CT angio of the chest which was negative for PE.

 

ASSESSMENT AND PLAN:

1.  This is a pleasant 87-year-old gentleman that presents to the emergency room complaining of ches
t pain.  It is very atypical and complaining of mild chest pressure, but more less some anxiety symp
toms.  It appears that his symptoms are most likely related to anxiety rather than coronary artery d
isease.  However, he says that the last time he saw Dr. Lujan was about a year ago.  He is not s
ure if he had a stress test at that time.  Therefore, we will place him in observation, rule him out
 and get a nuclear stress test in the a.m.  If this is negative, then I suspect that it is in fact a
nxiety related with regards to his symptoms and he can be safely discharged home with close outpatie
nt followup.  I have suggested that he talk with his primary care physician and likely seek grief co
unseling and he says he has already pursued this.

2.  We will continue his usual medications for seizures as well as coronary artery disease while he 
is here in the hospital.

## 2017-11-08 NOTE — NM
NUCLEAR MEDICINE CARDIAC STRESS WITH EJECTION FRACTION AND WALL MOTION:

 

Date:  11/08/17 

 

HISTORY:  

Chest pain. 

 

COMPARISON:  

None. 

 

TECHNIQUE:  

The patient was administered 9 mCi of technetium-99m sestamibi for rest imaging and 31 mCi of techne
tium-99m sestamibi for stress imaging. Cardiac gating is performed. 

 

FINDINGS:

Homogeneous distribution of the radiotracer in the left ventricle. No reversibility. No fixed defect
s. 

 

TID is 1.30. 

 

End-diastolic volume is 94 mL. 

 

End-systolic volume is 41 mL. 

 

CARDIAC GATING:

Normal wall motion and thickening. Ejection fraction is 57%. 

 

IMPRESSION: 

1.  Transient ischemic dilatation. 

2.  No reversibility or fixed defect. 

3.  Normal wall motion and thickening. Ejection fraction 57%. 

 

 

POS: [facility]

## 2017-11-08 NOTE — RAD
2 VIEWS OF CHEST:

 

Date:  11/07/17 

 

COMPARISON:  

03/27/17. 

 

HISTORY:  

Pneumonia. 

 

FINDINGS:

Two views of the chest show an enlarged but stable cardiomediastinal silhouette. The patient is stat
us post sternotomy. There is no evidence of consolidation, mass, or pleural effusion. Scarring is se
en in the left lung base. 

 

IMPRESSION: 

No evidence of acute cardiopulmonary disease. 

 

 

POS: [facility]